# Patient Record
Sex: FEMALE | Race: WHITE | HISPANIC OR LATINO | Employment: UNEMPLOYED | URBAN - METROPOLITAN AREA
[De-identification: names, ages, dates, MRNs, and addresses within clinical notes are randomized per-mention and may not be internally consistent; named-entity substitution may affect disease eponyms.]

---

## 2023-11-12 PROBLEM — K21.9 GERD (GASTROESOPHAGEAL REFLUX DISEASE): Status: ACTIVE | Noted: 2023-11-12

## 2023-11-12 PROBLEM — K59.09 CHRONIC CONSTIPATION: Status: ACTIVE | Noted: 2023-11-12

## 2023-11-12 RX ORDER — OMEPRAZOLE 20 MG/1
1 CAPSULE, DELAYED RELEASE ORAL
COMMUNITY
End: 2023-11-14 | Stop reason: ALTCHOICE

## 2023-11-12 RX ORDER — LACTULOSE 10 G/15ML
SOLUTION ORAL; RECTAL
COMMUNITY
End: 2023-12-01 | Stop reason: WASHOUT

## 2023-11-12 RX ORDER — HYDROXYZINE PAMOATE 25 MG/1
1 CAPSULE ORAL 3 TIMES DAILY PRN
COMMUNITY

## 2023-11-12 RX ORDER — DULOXETIN HYDROCHLORIDE 30 MG/1
1 CAPSULE, DELAYED RELEASE ORAL DAILY
COMMUNITY

## 2023-11-12 RX ORDER — IBUPROFEN 600 MG/1
1 TABLET ORAL EVERY 6 HOURS PRN
COMMUNITY
End: 2024-04-12 | Stop reason: WASHOUT

## 2023-11-12 RX ORDER — GABAPENTIN 400 MG/1
1 CAPSULE ORAL ONCE AS NEEDED
COMMUNITY

## 2023-11-12 RX ORDER — FLUTICASONE PROPIONATE 50 MCG
2 SPRAY, SUSPENSION (ML) NASAL DAILY
COMMUNITY
End: 2024-04-12 | Stop reason: WASHOUT

## 2023-11-14 ENCOUNTER — OFFICE VISIT (OUTPATIENT)
Dept: GASTROENTEROLOGY | Facility: CLINIC | Age: 63
End: 2023-11-14
Payer: MEDICARE

## 2023-11-14 VITALS
HEART RATE: 73 BPM | DIASTOLIC BLOOD PRESSURE: 85 MMHG | WEIGHT: 150 LBS | HEIGHT: 59 IN | SYSTOLIC BLOOD PRESSURE: 138 MMHG | BODY MASS INDEX: 30.24 KG/M2

## 2023-11-14 DIAGNOSIS — K76.0 FATTY LIVER: ICD-10-CM

## 2023-11-14 DIAGNOSIS — R10.9 ABDOMINAL PAIN, UNSPECIFIED ABDOMINAL LOCATION: ICD-10-CM

## 2023-11-14 DIAGNOSIS — R12 HEARTBURN: ICD-10-CM

## 2023-11-14 DIAGNOSIS — K64.9 HEMORRHOIDS, UNSPECIFIED HEMORRHOID TYPE: ICD-10-CM

## 2023-11-14 DIAGNOSIS — K62.5 RECTAL BLEEDING: Primary | ICD-10-CM

## 2023-11-14 PROCEDURE — 99215 OFFICE O/P EST HI 40 MIN: CPT | Performed by: NURSE PRACTITIONER

## 2023-11-14 PROCEDURE — 1036F TOBACCO NON-USER: CPT | Performed by: NURSE PRACTITIONER

## 2023-11-14 RX ORDER — OMEPRAZOLE 40 MG/1
40 CAPSULE, DELAYED RELEASE ORAL DAILY
Qty: 30 CAPSULE | Refills: 11 | Status: SHIPPED | OUTPATIENT
Start: 2023-11-14 | End: 2023-11-16 | Stop reason: SDUPTHER

## 2023-11-14 RX ORDER — HYDROCORTISONE ACETATE 25 MG/1
25 SUPPOSITORY RECTAL 2 TIMES DAILY
Qty: 20 SUPPOSITORY | Refills: 0 | Status: SHIPPED | OUTPATIENT
Start: 2023-11-14 | End: 2023-11-24

## 2023-11-14 NOTE — PATIENT INSTRUCTIONS
Next colonoscopy due in 3/2032.    I will start you on Linzess 72 mcg daily.  Rectal exam showed large hemorrhoids that were not actively bleeding.  Start Anusol suppositories twice daily x 10 days.    For your hemorrhoids:   You should ingest 20 to 30 g of insoluble fiber per day (table 2) and drink plenty of water (1.5 to 2 liters per day). Both are necessary to produce regular, soft stools, which reduce straining at defecation. It could take six weeks to fully realize the beneficial effect of fiber. After 15 days of treatment, those who had received fiber supplementation had significantly fewer bleeding episodes and a reduction in the number of hemorrhoids seen on endoscopy.  -Refrain from straining or lingering (eg, reading) on the toilet.  -Get regular physical exercise.  -Try to avoid medications that can cause constipation or diarrhea.  -Limit your intake of fatty foods and alcohol, which can exacerbate constipation.   -Sitz baths twice daily for 2 weeks helps decrease the inflammation.     For your liver fibrosis, please see a dietician.  Continue following at [facility] every 6 months for imaging and bloodwork.  High protein, low salt diet.      In order to treat GERD, it is important to stay at a healthy weight and/or lose weight if you are overweight. Ensure the head of the bed is elevated if you are affected by nighttime symptoms. Avoid foods such as excessive caffeine, chocolate, alcohol, peppermint and fatty foods. Avoid late meals and try to wear, loose comfortable clothing to decrease the pressure in the abdomen. Eat small, frequent meals that are non-acidic in nature. AVOID NSAIDs such as Motrin, Excedrin etc.     Thank you for coming to see me today. I hope that your questions have been answered. If you have any further concerns please call the office at any time. Have a great day!

## 2023-11-14 NOTE — PROGRESS NOTES
"FUV-Constipation and diarrhea, bloating, acid reflux, has gotten somewhat better, medication helps her with bowel movement,but now it is the opposite,  a lot of nausea, no vomiting, no cramping.    This note was created using voice recognition transcription software. Despite proofreading, unintentional typographical errors may be present. Please contact the GI office with any questions or concerns.       This is a 62 y.o.  yo Female who comes to the office today with reports of diarrhea, bloating and acid reflux .   She has a PMH of chronic back pain.  She is an established patient of Dr. Cabral.  LOV 8/22/23.    8/22/23-\"  Chronic constipation.  I think her main problem is actually chronic constipation.  I am not sure if the symptoms that she is referring to particular the upper GI symptoms are related to the chronic constipation as well because that appears to be causing significant bloating and pressure on the stomach causing worsening reflux.  I will try her on Linzess since she has not had that before.  Also order a GI transit colonic study to document the constipation.    GERD.  Continue on omeprazole for now.  I told her to bring us the endoscopy report  If the constipation is better she continues to have problems then she will need to be evaluated with a manometry and pH impedance test.\"        11/14/23-Linzess 145.  Giving her diarrhea.  Bloody BM's.  Having pain/burning.  5-7 liquid BM's daily. Having accidents too.  Back to back BM's.  Her bowel movements changed after her cholecystectomy.  Developed constipation. Was having a BM every 5-6 days.  Had some mild constipation before but not that bad.      She has a lot of heartburn/acid reflux.  She is controlled when avoiding citrus/acidic foods.  She gets a sour mouth and stomach.  She was on Omeprazole 20 mg daily before.         EGD with EUS: 3/1/22 for abnormal MRCP showing ampullary stenosis, biliary sphincterotomy performed, biliary tree swept and " "nothing was found.    Colonoscopy:  3/1/22 @ [facility] showing normal repeat in 10 years.  Family History:  Denies   Bowel habits: 1 bm per day, normal consistency, no blood, no weight loss   SHX: No ETOH, marijuana, drug use, smoking, vaping.  Ab Sx:  Yes  Cholecystectomy, 3 Csections, appendectomy  NSAIDs: Yes, every 2 days.      A 10 point review of system is negative except for what is mentioned in the HPI    Vital Signs: Reviewed    Physical Exam:  General: No apparent distress, pleasant and cooperative  Skin:  Warm and dry, no jaundice  HEENT: No scleral icterus, no conjunctival pallor, normocephalic, atraumatic, mucous membranes moist  Neck:  Atraumatic, trachea midline, no JVD  Chest:  Clear to auscultation bilaterally. No wheezes, rales, or rhonchi  CV:  Regular rate and rhythm.  Positive S1/S2  Abdomen: No distension, +BS, soft, non-tender to palpation, no rebound tenderness, no guarding, no rigidity, no discernible ascites   Extremities: No lower extremity edema, chronic pigmentary changes, no cyanosis  Neurological:  A&Ox3, no asterixis  Psychiatric: Cooperative     Investigations:  Labs, radiological imaging and cardiac work up were reviewed    Visit Vitals  /85 (BP Location: Right arm, Patient Position: Sitting, BP Cuff Size: Adult)   Pulse 73   Ht 1.499 m (4' 11\")   Wt 68 kg (150 lb)   BMI 30.30 kg/m²   Smoking Status Never   BSA 1.68 m²        Medication Documentation Review Audit       Reviewed by Gabriela Lee MA (Medical Assistant) on 11/14/23 at 0859      Medication Order Taking? Sig Documenting Provider Last Dose Status   DULoxetine (Cymbalta) 30 mg DR capsule [id number] No Take 1 capsule (30 mg) by mouth once daily. Do not crush or chew. Historical Provider, MD Not Taking Active   fluticasone (Flonase) 50 mcg/actuation nasal spray [id number] Yes Administer 2 sprays into each nostril once daily. Shake gently. Before first use, prime pump. After use, clean tip and replace cap. Historical " Provider, MD Taking Active   gabapentin (Neurontin) 400 mg capsule [id number] Yes Take 1 capsule (400 mg) by mouth 1 time if needed. Historical Provider, MD Taking Active   hydrOXYzine pamoate (Vistaril) 25 mg capsule [id number] No Take 1 capsule (25 mg) by mouth 3 times a day as needed for itching. Historical Provider, MD Not Taking Active   ibuprofen 600 mg tablet [id number] Yes Take 1 tablet (600 mg) by mouth every 6 hours if needed for mild pain (1 - 3). Historical Provider, MD Taking Active   lactulose 20 gram/30 mL oral solution [id number] No Take by mouth. As directed. Historical Provider, MD Not Taking Active   linaCLOtide (Linzess) 145 mcg capsule [id number] Yes Take 1 capsule (145 mcg) by mouth once daily in the morning. Take before meals. Do not crush or chew. Historical Provider, MD Taking Active   omeprazole (PriLOSEC) 20 mg DR capsule [id number] Yes Take 1 capsule (20 mg) by mouth once daily in the morning. Take before meals. Do not crush or chew. Historical Provider, MD Taking Active                     Assessment:    This is a 62 y.o.  yo Female who comes to the office today with reports of diarrhea, bloating and acid reflux .   She has a PMH of chronic back pain.  She is an established patient of Dr. Cabral.  LOV 8/22/23.  After her donn last year, developed constipation, having 1 BM every 5-6 days.  Started on Linzess 145 daily and now has explosive diarrhea, 5-6 liquid stools daily (back to back) and now with blood and rectal discomfort.  Colonoscopy @ [facility] last year was normal.  Rectal exam showed large external non-bleeding hemorrhoids.  Also mentions epigastric pain, heartburn, and an acidic taste in her mouth.  Was on Omeprazole 20 mg daily but it was discontinued by another provider.  She would like an EGD to evaluate her symptoms.  Has stage 3 fibrosis per fibroscan done on 4/3/23.  She states she follows with a provider @ [facility] for this.      Plan  1.)  Colonoscopy screening-Colonoscopy due  3/2032  2.)  UGI symptoms-EGD to be scheduled @ [facility] with Dr. Cabral.  Consider Adam capsule placement.  GERD education.  Start Omeprazole 40 mg daily.  3.)  S3 Liver fibrosis-Continue following @ [facility]. Dietician referral  4.)  Constipation/rectal bleeding-Stop Linzess 145 and start 72.  Samples provided.  Anusol suppositories bid x 2 weeks.  Sitz baths bid x 2 weeks.  5.)  Follow up in 2-3 weeks.      I spent 52 minutes in the professional and overall care of this patient.

## 2023-11-16 DIAGNOSIS — R10.9 ABDOMINAL PAIN, UNSPECIFIED ABDOMINAL LOCATION: ICD-10-CM

## 2023-11-16 DIAGNOSIS — R12 HEARTBURN: ICD-10-CM

## 2023-11-16 RX ORDER — OMEPRAZOLE 40 MG/1
40 CAPSULE, DELAYED RELEASE ORAL DAILY
Qty: 90 CAPSULE | Refills: 3 | Status: SHIPPED | OUTPATIENT
Start: 2023-11-16 | End: 2024-11-15

## 2023-12-04 ENCOUNTER — ANESTHESIA EVENT (OUTPATIENT)
Dept: GASTROENTEROLOGY | Facility: HOSPITAL | Age: 63
End: 2023-12-04
Payer: MEDICARE

## 2023-12-04 ENCOUNTER — HOSPITAL ENCOUNTER (OUTPATIENT)
Dept: GASTROENTEROLOGY | Facility: HOSPITAL | Age: 63
Discharge: HOME | End: 2023-12-04
Payer: MEDICARE

## 2023-12-04 ENCOUNTER — ANESTHESIA (OUTPATIENT)
Dept: GASTROENTEROLOGY | Facility: HOSPITAL | Age: 63
End: 2023-12-04
Payer: MEDICARE

## 2023-12-04 VITALS
HEART RATE: 72 BPM | BODY MASS INDEX: 30.22 KG/M2 | DIASTOLIC BLOOD PRESSURE: 70 MMHG | RESPIRATION RATE: 16 BRPM | TEMPERATURE: 97 F | SYSTOLIC BLOOD PRESSURE: 124 MMHG | HEIGHT: 59 IN | WEIGHT: 149.91 LBS | OXYGEN SATURATION: 94 %

## 2023-12-04 DIAGNOSIS — R10.9 ABDOMINAL PAIN, UNSPECIFIED ABDOMINAL LOCATION: ICD-10-CM

## 2023-12-04 DIAGNOSIS — R12 HEARTBURN: Primary | ICD-10-CM

## 2023-12-04 PROCEDURE — 2500000005 HC RX 250 GENERAL PHARMACY W/O HCPCS: Performed by: NURSE ANESTHETIST, CERTIFIED REGISTERED

## 2023-12-04 PROCEDURE — 3700000001 HC GENERAL ANESTHESIA TIME - INITIAL BASE CHARGE

## 2023-12-04 PROCEDURE — 7100000009 HC PHASE TWO TIME - INITIAL BASE CHARGE

## 2023-12-04 PROCEDURE — 3700000002 HC GENERAL ANESTHESIA TIME - EACH INCREMENTAL 1 MINUTE

## 2023-12-04 PROCEDURE — 2500000004 HC RX 250 GENERAL PHARMACY W/ HCPCS (ALT 636 FOR OP/ED): Performed by: NURSE ANESTHETIST, CERTIFIED REGISTERED

## 2023-12-04 PROCEDURE — 88305 TISSUE EXAM BY PATHOLOGIST: CPT | Performed by: PATHOLOGY

## 2023-12-04 PROCEDURE — A43239 PR EDG TRANSORAL BIOPSY SINGLE/MULTIPLE: Performed by: NURSE ANESTHETIST, CERTIFIED REGISTERED

## 2023-12-04 PROCEDURE — 2500000004 HC RX 250 GENERAL PHARMACY W/ HCPCS (ALT 636 FOR OP/ED): Performed by: INTERNAL MEDICINE

## 2023-12-04 PROCEDURE — 0753T DGTZ GLS MCRSCP SLD LEVEL IV: CPT | Mod: TC,SUR,PARLAB | Performed by: INTERNAL MEDICINE

## 2023-12-04 PROCEDURE — A43239 PR EDG TRANSORAL BIOPSY SINGLE/MULTIPLE: Performed by: ANESTHESIOLOGY

## 2023-12-04 PROCEDURE — 43239 EGD BIOPSY SINGLE/MULTIPLE: CPT | Performed by: INTERNAL MEDICINE

## 2023-12-04 PROCEDURE — 7100000010 HC PHASE TWO TIME - EACH INCREMENTAL 1 MINUTE

## 2023-12-04 RX ORDER — ONDANSETRON HYDROCHLORIDE 2 MG/ML
4 INJECTION, SOLUTION INTRAVENOUS ONCE AS NEEDED
Status: DISCONTINUED | OUTPATIENT
Start: 2023-12-04 | End: 2023-12-05 | Stop reason: HOSPADM

## 2023-12-04 RX ORDER — SODIUM CHLORIDE, SODIUM LACTATE, POTASSIUM CHLORIDE, CALCIUM CHLORIDE 600; 310; 30; 20 MG/100ML; MG/100ML; MG/100ML; MG/100ML
20 INJECTION, SOLUTION INTRAVENOUS CONTINUOUS
Status: DISCONTINUED | OUTPATIENT
Start: 2023-12-04 | End: 2023-12-05 | Stop reason: HOSPADM

## 2023-12-04 RX ORDER — LIDOCAINE HCL/PF 100 MG/5ML
SYRINGE (ML) INTRAVENOUS AS NEEDED
Status: DISCONTINUED | OUTPATIENT
Start: 2023-12-04 | End: 2023-12-04

## 2023-12-04 RX ORDER — PROPOFOL 10 MG/ML
INJECTION, EMULSION INTRAVENOUS CONTINUOUS PRN
Status: DISCONTINUED | OUTPATIENT
Start: 2023-12-04 | End: 2023-12-04

## 2023-12-04 RX ORDER — PROPOFOL 10 MG/ML
INJECTION, EMULSION INTRAVENOUS AS NEEDED
Status: DISCONTINUED | OUTPATIENT
Start: 2023-12-04 | End: 2023-12-04

## 2023-12-04 RX ADMIN — PROPOFOL 30 MG: 10 INJECTION, EMULSION INTRAVENOUS at 08:06

## 2023-12-04 RX ADMIN — SODIUM CHLORIDE, POTASSIUM CHLORIDE, SODIUM LACTATE AND CALCIUM CHLORIDE 20 ML/HR: 600; 310; 30; 20 INJECTION, SOLUTION INTRAVENOUS at 07:48

## 2023-12-04 RX ADMIN — PROPOFOL 30 MG: 10 INJECTION, EMULSION INTRAVENOUS at 08:07

## 2023-12-04 RX ADMIN — LIDOCAINE HYDROCHLORIDE 40 MG: 20 INJECTION INTRAVENOUS at 08:06

## 2023-12-04 RX ADMIN — PROPOFOL 150 MCG/KG/MIN: 10 INJECTION, EMULSION INTRAVENOUS at 08:06

## 2023-12-04 SDOH — HEALTH STABILITY: MENTAL HEALTH: CURRENT SMOKER: 0

## 2023-12-04 ASSESSMENT — PAIN SCALES - GENERAL
PAINLEVEL_OUTOF10: 0 - NO PAIN

## 2023-12-04 ASSESSMENT — PAIN - FUNCTIONAL ASSESSMENT: PAIN_FUNCTIONAL_ASSESSMENT: 0-10

## 2023-12-04 ASSESSMENT — COLUMBIA-SUICIDE SEVERITY RATING SCALE - C-SSRS
6. HAVE YOU EVER DONE ANYTHING, STARTED TO DO ANYTHING, OR PREPARED TO DO ANYTHING TO END YOUR LIFE?: NO
1. IN THE PAST MONTH, HAVE YOU WISHED YOU WERE DEAD OR WISHED YOU COULD GO TO SLEEP AND NOT WAKE UP?: NO
2. HAVE YOU ACTUALLY HAD ANY THOUGHTS OF KILLING YOURSELF?: NO

## 2023-12-04 NOTE — DISCHARGE INSTRUCTIONS

## 2023-12-04 NOTE — POST-PROCEDURE NOTE
Pt is tolerating PO snack well.  Reviewed discharge instructions, educated pt  on anesthesia safety.   All pre-op belongings with the pt.  Supervised upright ambultion to BR with steady gait prior to discharge

## 2023-12-04 NOTE — H&P
"History Of Present Illness  Jesika Billings is a 63 y.o. female presenting with stomach issues and acid reflux. Here for EGD.     Past Medical History  History reviewed. No pertinent past medical history.    Surgical History  History reviewed. No pertinent surgical history.     Social History  She reports that she has never smoked. She has never used smokeless tobacco. She reports that she does not drink alcohol and does not use drugs.    Family History  Family History   Family history unknown: Yes        Allergies  Patient has no known allergies.    Review of Systems     Physical Exam     Last Recorded Vitals  Blood pressure 111/71, pulse 75, temperature 36.1 °C (97 °F), temperature source Temporal, resp. rate 18, height 1.499 m (4' 11\"), weight 68 kg (149 lb 14.6 oz), SpO2 94 %.    Relevant Results              Astrid Cabello MD    "

## 2023-12-04 NOTE — ANESTHESIA POSTPROCEDURE EVALUATION
Patient: Jesika Billings    Procedure Summary       Date: 12/04/23 Room / Location: [facility]    Anesthesia Start: 0802 Anesthesia Stop: 0820    Procedure: EGD Diagnosis:       Heartburn      Abdominal pain, unspecified abdominal location      Heartburn    Scheduled Providers: Astrid Cabello MD Responsible Provider: Saad Dias MD    Anesthesia Type: MAC ASA Status: 2            Anesthesia Type: MAC    Vitals Value Taken Time   /70 12/04/23 0846   Temp 36.1 °C (97 °F) 12/04/23 0820   Pulse 72 12/04/23 0845   Resp 16 12/04/23 0845   SpO2 94 % 12/04/23 0845   Vitals shown include unvalidated device data.    Anesthesia Post Evaluation    Patient location during evaluation: PACU  Patient participation: complete - patient participated  Level of consciousness: awake and alert  Pain management: adequate  Airway patency: patent  Cardiovascular status: acceptable  Respiratory status: acceptable  Hydration status: acceptable  Postoperative Nausea and Vomiting: none        No notable events documented.

## 2023-12-04 NOTE — ANESTHESIA PREPROCEDURE EVALUATION
Patient: Jesika Billings    Procedure Information       Date/Time: 12/04/23 0800    Scheduled providers: Astrid Cabello MD    Procedure: EGD    Location: [facility]            Relevant Problems   Anesthesia (within normal limits)      GI   (+) GERD (gastroesophageal reflux disease)       Clinical information reviewed:   Tobacco  Allergies  Meds   Med Hx  Surg Hx   Fam Hx  Soc Hx        NPO Detail:  NPO/Void Status  Date of Last Liquid: 12/03/23  Time of Last Liquid: 2300  Date of Last Solid: 12/03/23  Time of Last Solid: 2300         Physical Exam    Airway  Mallampati: II  TM distance: >3 FB  Neck ROM: full     Cardiovascular - normal exam     Dental    Pulmonary - normal exam     Abdominal            Anesthesia Plan    ASA 2     MAC     The patient is not a current smoker.    intravenous induction   Anesthetic plan and risks discussed with patient.    Plan discussed with CRNA.

## 2023-12-15 LAB
LABORATORY COMMENT REPORT: NORMAL
PATH REPORT.FINAL DX SPEC: NORMAL
PATH REPORT.GROSS SPEC: NORMAL
PATH REPORT.RELEVANT HX SPEC: NORMAL
PATH REPORT.TOTAL CANCER: NORMAL

## 2024-01-24 ENCOUNTER — TELEPHONE (OUTPATIENT)
Dept: GASTROENTEROLOGY | Facility: CLINIC | Age: 64
End: 2024-01-24
Payer: MEDICARE

## 2024-01-24 NOTE — TELEPHONE ENCOUNTER
Sent a message to Eileen to please schedule Jesika for a virtual with Brittney next month. GIOVANY

## 2024-03-18 ENCOUNTER — OFFICE VISIT (OUTPATIENT)
Dept: GASTROENTEROLOGY | Facility: CLINIC | Age: 64
End: 2024-03-18
Payer: MEDICARE

## 2024-03-18 VITALS
HEIGHT: 59 IN | SYSTOLIC BLOOD PRESSURE: 137 MMHG | WEIGHT: 141 LBS | DIASTOLIC BLOOD PRESSURE: 82 MMHG | HEART RATE: 70 BPM | BODY MASS INDEX: 28.43 KG/M2

## 2024-03-18 DIAGNOSIS — R10.9 ABDOMINAL PAIN, UNSPECIFIED ABDOMINAL LOCATION: ICD-10-CM

## 2024-03-18 DIAGNOSIS — R12 HEARTBURN: ICD-10-CM

## 2024-03-18 DIAGNOSIS — K62.5 RECTAL BLEEDING: ICD-10-CM

## 2024-03-18 DIAGNOSIS — K64.9 HEMORRHOIDS, UNSPECIFIED HEMORRHOID TYPE: ICD-10-CM

## 2024-03-18 DIAGNOSIS — K74.00 LIVER FIBROSIS: ICD-10-CM

## 2024-03-18 DIAGNOSIS — K76.0 FATTY LIVER: Primary | ICD-10-CM

## 2024-03-18 DIAGNOSIS — K59.09 CHRONIC CONSTIPATION: ICD-10-CM

## 2024-03-18 DIAGNOSIS — B19.20 HEPATITIS C VIRUS INFECTION WITHOUT HEPATIC COMA, UNSPECIFIED CHRONICITY: ICD-10-CM

## 2024-03-18 PROCEDURE — 99215 OFFICE O/P EST HI 40 MIN: CPT | Performed by: STUDENT IN AN ORGANIZED HEALTH CARE EDUCATION/TRAINING PROGRAM

## 2024-03-18 PROCEDURE — 1036F TOBACCO NON-USER: CPT | Performed by: STUDENT IN AN ORGANIZED HEALTH CARE EDUCATION/TRAINING PROGRAM

## 2024-03-18 RX ORDER — SENNOSIDES 8.6 MG/1
8.6 CAPSULE, GELATIN COATED ORAL NIGHTLY
Qty: 30 CAPSULE | Refills: 11 | Status: SHIPPED | OUTPATIENT
Start: 2024-03-18 | End: 2025-03-18

## 2024-03-18 NOTE — PROGRESS NOTES
63-year-old lady with history of anxiety, back pain, constipation, GERD, cholecystectomy, appendectomy,  presenting with worsening heartburn.  Patient has heartburn for many years, recently worsening, occurring even without food when she wakes up in the morning with a sour taste in her mouth.  She mentioned that the symptoms occur at the same time with abdominal pressure and bloating.    EGD and EUS 3/2022 for abnormal MRCP showing ampullary stenosis, bile sphincterotomy done, bile tree swept with no findings  Colonoscopy 3/2022 unremarkable, repeat in 10 years  Family history reviewed, not pertinent to chief complaint  1 bowel movement every 2 days  Denies NSAIDs alcohol use smoking, ex marijuana          The note was created using voice recognition transcription software. Despite proofreading, unintentional typographical errors may be present. Please contact the GI office with any questions or concerns.     Current Medications: reviewed    A 10 point review of system is negative except for what is mentioned in the HPI    Follow up with GI was advised       Vital Signs: Reviewed    Physical Exam:  General: no apparent distress, pleasant and cooperative  Skin:  Warm and dry, no jaundice  HEENT: No scleral icterus, no conjunctival pallor, normocephalic, atraumatic, mucous membranes moist  Neck:  atraumatic, trachea midline, no JVD  Chest:  decreased air entry to auscultation bilaterally. No wheezes, rales, or rhonchi  CV:  Regular rate and rhythm.  Positive S1/S2  Abdomen: no distension, +BS, soft, non-tender to palpation, no rebound tenderness, no guarding, no rigidity, no discernible ascites   Extremities: no lower extremity edema, Chronic pigmentary changes, no cyanosis  Neurological:  A&Ox3 , no asterixis  Psychiatric: cooperative     Investigations:  Labs, radiological imaging and cardiac work up were reviewed    1-Hepatitis C antibody positive in  and 2021, hepatitis C PCR negative and  3/2022, mentioned that she previously received treatment for hepatitis C, negative HIV    2-BMI 28, healthy lifestyle advised    3-Healthcare maintenance, colonoscopy as above, repeat in 2032 week of twice daily MiraLAX    4-IBS-C, KUB 8/2023 showing mild amount of stools, did not tolerate Linzess 145 mcg daily, currently better on Linzess 72 mcg daily, still has some intermittent incomplete emptying and abdominal pressure, start senna 1 tablet at night, increase Gaviscon    5-chronic heartburn, recently worsening, currently taking omeprazole 40 mg in the morning and then at noon, still having some symptoms even without food, EGD as above, will optimize IBS treatment because patient described that her symptoms are worse at the same time as abdominal bloating and distention and pressure,  consider esophageal manometry and pH study for possible functional heartburn    6-liver fibrosis, prior HCV status posttreatment as per patient, coarse liver rule out cirrhosis on imaging 6/2022    Ferritin 395, iron 79, TIBC 222, MEGAN 1/160 [otherwise negative autoimmune acute viral a1at], check ceruloplasmin elf score hepatitis a and B status, FibroScan, can take Tylenol up to 4 extra things per day as needed for pain, avoid alcohol NSAIDs over-the-counter medications herbals

## 2024-03-18 NOTE — PATIENT INSTRUCTIONS
1-please start taking senna 1 tablet at night, increase Gaviscon to 3-4 times a day  2-for your heartburn will need to check an esophageal manometry test and pH study, please continue with omeprazole 40 mg twice daily half an hour before food, please elevate the head of the bed 6 to 8 inches, have an early dinner at least 3 hours before sleep, have small frequent meals (5 small meals per day), avoid lying down after meals, avoid spices juices soda tomatoes agata oranges caffeine, other caffeinated beverages, chocolate alcohol NSAIDs smoking fatty food peppermint among others  3-we need to check some blood test and a special ultrasound for your liver

## 2024-04-15 ENCOUNTER — HOSPITAL ENCOUNTER (OUTPATIENT)
Dept: GASTROENTEROLOGY | Facility: HOSPITAL | Age: 64
Discharge: HOME | End: 2024-04-15
Payer: MEDICARE

## 2024-04-15 VITALS
DIASTOLIC BLOOD PRESSURE: 84 MMHG | HEART RATE: 72 BPM | SYSTOLIC BLOOD PRESSURE: 136 MMHG | RESPIRATION RATE: 18 BRPM | OXYGEN SATURATION: 97 %

## 2024-04-15 DIAGNOSIS — R12 HEARTBURN: ICD-10-CM

## 2024-04-15 PROCEDURE — 91010 ESOPHAGUS MOTILITY STUDY: CPT

## 2024-04-15 PROCEDURE — 91034 GASTROESOPHAGEAL REFLUX TEST: CPT

## 2024-04-15 PROCEDURE — 91010 ESOPHAGUS MOTILITY STUDY: CPT | Performed by: INTERNAL MEDICINE

## 2024-04-15 PROCEDURE — 2720000007 HC OR 272 NO HCPCS

## 2024-04-15 RX ORDER — LIDOCAINE HYDROCHLORIDE 20 MG/ML
1 JELLY TOPICAL ONCE
Status: CANCELLED | OUTPATIENT
Start: 2024-04-15

## 2024-04-18 PROCEDURE — 91038 ESOPH IMPED FUNCT TEST > 1HR: CPT | Performed by: INTERNAL MEDICINE

## 2024-04-18 PROCEDURE — 91034 GASTROESOPHAGEAL REFLUX TEST: CPT | Performed by: INTERNAL MEDICINE

## 2024-04-24 ENCOUNTER — APPOINTMENT (OUTPATIENT)
Dept: GASTROENTEROLOGY | Facility: HOSPITAL | Age: 64
End: 2024-04-24
Payer: MEDICARE

## 2024-12-02 DIAGNOSIS — K59.09 CHRONIC CONSTIPATION: ICD-10-CM

## 2024-12-03 DIAGNOSIS — K59.09 CHRONIC CONSTIPATION: Primary | ICD-10-CM
